# Patient Record
Sex: FEMALE | Race: WHITE | NOT HISPANIC OR LATINO | Employment: UNEMPLOYED | URBAN - METROPOLITAN AREA
[De-identification: names, ages, dates, MRNs, and addresses within clinical notes are randomized per-mention and may not be internally consistent; named-entity substitution may affect disease eponyms.]

---

## 2017-01-19 ENCOUNTER — OFFICE VISIT (OUTPATIENT)
Dept: FAMILY MEDICINE | Facility: CLINIC | Age: 42
End: 2017-01-19
Payer: COMMERCIAL

## 2017-01-19 VITALS
BODY MASS INDEX: 27.75 KG/M2 | SYSTOLIC BLOOD PRESSURE: 96 MMHG | DIASTOLIC BLOOD PRESSURE: 64 MMHG | HEIGHT: 69 IN | HEART RATE: 77 BPM | TEMPERATURE: 98 F | OXYGEN SATURATION: 99 % | WEIGHT: 187.38 LBS

## 2017-01-19 DIAGNOSIS — N30.01 ACUTE CYSTITIS WITH HEMATURIA: Primary | ICD-10-CM

## 2017-01-19 LAB
BILIRUB SERPL-MCNC: NORMAL MG/DL
BLOOD URINE, POC: NORMAL
COLOR, POC UA: YELLOW
GLUCOSE UR QL STRIP: NORMAL
KETONES UR QL STRIP: NORMAL
LEUKOCYTE ESTERASE URINE, POC: NORMAL
NITRITE, POC UA: NORMAL
PH, POC UA: 5
PROTEIN, POC: 30
SPECIFIC GRAVITY, POC UA: 1.02
UROBILINOGEN, POC UA: NORMAL

## 2017-01-19 PROCEDURE — 99999 PR PBB SHADOW E&M-NEW PATIENT-LVL III: CPT | Mod: PBBFAC,,, | Performed by: NURSE PRACTITIONER

## 2017-01-19 PROCEDURE — 81002 URINALYSIS NONAUTO W/O SCOPE: CPT | Mod: S$GLB,,, | Performed by: NURSE PRACTITIONER

## 2017-01-19 PROCEDURE — 1159F MED LIST DOCD IN RCRD: CPT | Mod: S$GLB,,, | Performed by: NURSE PRACTITIONER

## 2017-01-19 PROCEDURE — 99202 OFFICE O/P NEW SF 15 MIN: CPT | Mod: 25,S$GLB,, | Performed by: NURSE PRACTITIONER

## 2017-01-19 RX ORDER — CIPROFLOXACIN 500 MG/1
500 TABLET ORAL 2 TIMES DAILY
Qty: 14 TABLET | Refills: 0 | Status: SHIPPED | OUTPATIENT
Start: 2017-01-19 | End: 2017-01-20

## 2017-01-19 NOTE — PROGRESS NOTES
Subjective:       Patient ID: Yue Ag is a 41 y.o. female.    Chief Complaint: Urinary Tract Infection (buring) and Urinary Frequency    HPI     Patient presents to clinic with complaints of urinary frequency, dysuria over the past 3 days.   Sx seemed to wax and wane since onset. She has taken otc medications without relief sx.   Denies fever, chills, n/v, flank pain. Notes occasional pelvic pressure.     Review of Systems   Constitutional: Negative for chills, diaphoresis and fever.   Gastrointestinal: Negative for nausea and vomiting.   Genitourinary: Positive for dysuria, hematuria and urgency. Negative for flank pain and frequency.   Skin: Negative for rash and wound.   Neurological: Negative for dizziness and headaches.       Objective:      Physical Exam   Constitutional: She is oriented to person, place, and time. She appears well-developed and well-nourished. No distress.   Cardiovascular: Normal rate, regular rhythm and normal heart sounds.  Exam reveals no gallop and no friction rub.    No murmur heard.  Pulmonary/Chest: Effort normal and breath sounds normal. No respiratory distress. She has no wheezes. She has no rales.   Abdominal: Soft. Bowel sounds are normal. There is no tenderness. There is no CVA tenderness.   Musculoskeletal: Normal range of motion. She exhibits no edema or tenderness.   Neurological: She is alert and oriented to person, place, and time. No cranial nerve deficit. Coordination normal.   Skin: Skin is warm and dry. No rash noted. She is not diaphoretic. No erythema.   Psychiatric: She has a normal mood and affect. Her behavior is normal.   Nursing note and vitals reviewed.      Assessment:       1. Acute cystitis with hematuria        Plan:   Yue was seen today for urinary tract infection and urinary frequency.    Diagnoses and all orders for this visit:    Acute cystitis with hematuria  -     POCT URINE DIPSTICK WITHOUT MICROSCOPE  -     ciprofloxacin HCl (CIPRO) 500  MG tablet; Take 1 tablet (500 mg total) by mouth 2 (two) times daily.  Side effects and precautions of abx use reviewed with patient, expressed understanding. No questions or concerns.

## 2017-01-20 ENCOUNTER — TELEPHONE (OUTPATIENT)
Dept: FAMILY MEDICINE | Facility: CLINIC | Age: 42
End: 2017-01-20

## 2017-01-20 RX ORDER — NITROFURANTOIN 25; 75 MG/1; MG/1
100 CAPSULE ORAL 2 TIMES DAILY
Qty: 14 CAPSULE | Refills: 0 | Status: SHIPPED | OUTPATIENT
Start: 2017-01-20 | End: 2017-01-27

## 2017-01-20 NOTE — TELEPHONE ENCOUNTER
Pt started taking the cipro yesterday and has developed palpitations, feeling of hot spots on her skin and insomnia. She is requesting change of abx.

## 2020-01-06 PROBLEM — M1A.09X0 IDIOPATHIC CHRONIC GOUT OF MULTIPLE SITES WITHOUT TOPHUS: Status: ACTIVE | Noted: 2020-01-06

## 2020-01-06 PROBLEM — E78.5 DYSLIPIDEMIA: Status: RESOLVED | Noted: 2020-01-06 | Resolved: 2020-01-06

## 2020-01-06 PROBLEM — E78.5 DYSLIPIDEMIA: Status: ACTIVE | Noted: 2020-01-06

## 2021-05-06 ENCOUNTER — PATIENT MESSAGE (OUTPATIENT)
Dept: RESEARCH | Facility: HOSPITAL | Age: 46
End: 2021-05-06